# Patient Record
Sex: FEMALE | Race: BLACK OR AFRICAN AMERICAN | URBAN - METROPOLITAN AREA
[De-identification: names, ages, dates, MRNs, and addresses within clinical notes are randomized per-mention and may not be internally consistent; named-entity substitution may affect disease eponyms.]

---

## 2023-05-22 ENCOUNTER — OFFICE VISIT (OUTPATIENT)
Dept: ORTHOPEDIC SURGERY | Age: 23
End: 2023-05-22
Payer: COMMERCIAL

## 2023-05-22 DIAGNOSIS — M25.371 RIGHT ANKLE INSTABILITY: Primary | ICD-10-CM

## 2023-05-22 PROCEDURE — 99204 OFFICE O/P NEW MOD 45 MIN: CPT | Performed by: ORTHOPAEDIC SURGERY

## 2023-05-22 RX ORDER — ACETAMINOPHEN 500 MG
500 TABLET ORAL EVERY 6 HOURS PRN
COMMUNITY

## 2023-05-22 RX ORDER — MEDROXYPROGESTERONE ACETATE 150 MG/ML
INJECTION, SUSPENSION INTRAMUSCULAR
COMMUNITY
Start: 2023-03-07

## 2023-05-22 RX ORDER — IBUPROFEN 200 MG
200 TABLET ORAL EVERY 6 HOURS PRN
COMMUNITY

## 2023-05-22 NOTE — PROGRESS NOTES
Name: Yfn Toure  YOB: 2000  Gender: female  MRN: [id number]    Summary:   Right chronic lateral ankle instability       CC: New Patient (RT ankle sprain this has been ongoing for several years last couple of months her ankle are buckling went to [facility] urgent care 1 month ago told to bring disc and report no sx/ told ES / Florentino Greenhouse)       HPI: Yfn Toure is a 26 y.o. female who presents with New Patient (RT ankle sprain this has been ongoing for several years last couple of months her ankle are buckling went to [facility] urgent care 1 month ago told to bring disc and report no sx/ told ES / Florentino Greenhouse)  . This patient presents the office today with a history of multiple sprains of her right ankle. Her last was about 1 month ago. She has instability and giving way sensations on a daily basis in the lateral aspect of her ankle. She is tried bracing and some rehab for this in the past without much benefit. History was obtained by Patient     ROS/Meds/PSH/PMH/FH/SH: I personally reviewed the patients standard intake form. Below are the pertinents    Tobacco:  has no history on file for tobacco use. Diabetes: None      Physical Examination:  Exam examination the right foot and ankle shows significant instability in the right ankle with increased anterior drawer laxity and talar tilt laxity when compared to the contralateral extremity. There is no cavovarus foot identified. There is no evidence of generalized ligament laxity. There is some mild tenderness to palpation of the peroneal tendons. She has palpable pulses and intact sensation. Of specific note her posterior medial ankle is nontender and not worse with plantarflexion.       Imaging:   I independently interpreted XR ordered by a different physician, taken from an outside facility right ankle which shows a small os trigonum with no evidence of fracture or OCD           Jose Sigala III, MD           Assessment:   Right chronic lateral ankle